# Patient Record
Sex: FEMALE | Race: BLACK OR AFRICAN AMERICAN | URBAN - METROPOLITAN AREA
[De-identification: names, ages, dates, MRNs, and addresses within clinical notes are randomized per-mention and may not be internally consistent; named-entity substitution may affect disease eponyms.]

---

## 2021-10-12 ENCOUNTER — OFFICE VISIT (OUTPATIENT)
Dept: URBAN - METROPOLITAN AREA CLINIC 92 | Facility: CLINIC | Age: 46
End: 2021-10-12
Payer: COMMERCIAL

## 2021-10-12 DIAGNOSIS — R12 HEARTBURN: ICD-10-CM

## 2021-10-12 DIAGNOSIS — R13.10 DYSPHAGIA: ICD-10-CM

## 2021-10-12 DIAGNOSIS — R10.13 DYSPEPSIA: ICD-10-CM

## 2021-10-12 DIAGNOSIS — R10.13 EPIGASTRIC ABDOMINAL PAIN: ICD-10-CM

## 2021-10-12 PROCEDURE — 99244 OFF/OP CNSLTJ NEW/EST MOD 40: CPT | Performed by: INTERNAL MEDICINE

## 2021-10-12 RX ORDER — FAMOTIDINE 40 MG/1
1 TABLET AT BEDTIME TABLET, FILM COATED ORAL ONCE A DAY
Qty: 30 TABLET | Refills: 11 | OUTPATIENT
Start: 2021-10-12

## 2021-10-12 RX ORDER — OMEPRAZOLE 40 MG/1
1 CAPSULE 30 MINUTES BEFORE MORNING MEAL CAPSULE, DELAYED RELEASE ORAL TWICE DAILY
Qty: 60 | Refills: 11 | OUTPATIENT
Start: 2021-10-12

## 2021-10-12 NOTE — HPI-TODAY'S VISIT:
Patient was referred by Dr. Liss Saenz for an evaluation of epig abd pain.  A copy of this note will be sent to the referring provider   Abd pain Location epig Quality burning Frequency daily  Duration 1y  Radiation none Severity moderate Exacerbating factors eating Relieving factors tums   Assoc with nausea dyspepsia and htbrn w/dysphagia/globus   Denies diarrhea constipation hematochezia melena jaundice unintentional wt loss   Denies odynophagia food impaction CP cough anorexia light headedness   Denies scleral icterus, increased abd girth, LE edema, confusion, disorientation, memory loss, hematemesis  NSAID usage  EtOH / Tob  Fam Hx GI cancer  Prior EGD/colon, CT scan, RUQ US

## 2021-10-27 ENCOUNTER — OFFICE VISIT (OUTPATIENT)
Dept: URBAN - METROPOLITAN AREA SURGERY CENTER 16 | Facility: SURGERY CENTER | Age: 46
End: 2021-10-27
Payer: COMMERCIAL

## 2021-10-27 DIAGNOSIS — K22.2 ACQUIRED ESOPHAGEAL RING: ICD-10-CM

## 2021-10-27 DIAGNOSIS — K29.60 ADENOPAPILLOMATOSIS GASTRICA: ICD-10-CM

## 2021-10-27 PROCEDURE — 43248 EGD GUIDE WIRE INSERTION: CPT | Performed by: INTERNAL MEDICINE

## 2021-10-27 PROCEDURE — 43239 EGD BIOPSY SINGLE/MULTIPLE: CPT | Performed by: INTERNAL MEDICINE

## 2021-11-10 ENCOUNTER — OFFICE VISIT (OUTPATIENT)
Dept: URBAN - METROPOLITAN AREA TELEHEALTH 2 | Facility: TELEHEALTH | Age: 46
End: 2021-11-10
Payer: COMMERCIAL

## 2021-11-10 DIAGNOSIS — R12 HEARTBURN: ICD-10-CM

## 2021-11-10 DIAGNOSIS — K21.9 GERD (GASTROESOPHAGEAL REFLUX DISEASE): ICD-10-CM

## 2021-11-10 DIAGNOSIS — Z12.11 SCREEN FOR COLON CANCER: ICD-10-CM

## 2021-11-10 DIAGNOSIS — R13.19 DYSPHAGIA: ICD-10-CM

## 2021-11-10 DIAGNOSIS — K29.70 GASTRITIS: ICD-10-CM

## 2021-11-10 DIAGNOSIS — R10.13 EPIGASTRIC ABDOMINAL PAIN: ICD-10-CM

## 2021-11-10 DIAGNOSIS — K22.2 ESOPHAGEAL STENOSIS: ICD-10-CM

## 2021-11-10 PROCEDURE — 99214 OFFICE O/P EST MOD 30 MIN: CPT | Performed by: INTERNAL MEDICINE

## 2021-11-10 RX ORDER — OMEPRAZOLE 40 MG/1
1 CAPSULE 30 MINUTES BEFORE MORNING MEAL CAPSULE, DELAYED RELEASE ORAL TWICE DAILY
Qty: 60 | Refills: 11 | Status: ACTIVE | COMMUNITY
Start: 2021-10-12

## 2021-11-10 RX ORDER — OMEPRAZOLE 40 MG/1
1 CAPSULE 30 MINUTES BEFORE MORNING MEAL CAPSULE, DELAYED RELEASE ORAL TWICE DAILY
Qty: 60 | Refills: 11 | OUTPATIENT

## 2021-11-10 RX ORDER — FAMOTIDINE 40 MG/1
1 TABLET AT BEDTIME TABLET, FILM COATED ORAL ONCE A DAY
Qty: 30 TABLET | Refills: 11 | OUTPATIENT

## 2021-11-10 RX ORDER — FAMOTIDINE 40 MG/1
1 TABLET AT BEDTIME TABLET, FILM COATED ORAL ONCE A DAY
Qty: 30 TABLET | Refills: 11 | Status: ACTIVE | COMMUNITY
Start: 2021-10-12

## 2021-11-10 NOTE — HPI-TODAY'S VISIT:
wt stable# over 1mo (was 200)  10/27/21 EGD grade 3 reflux esophagitis EGJ stenosis, dilated to 51F non-hpylori gastritis  taking PPI/H2B   Abd pain less frequent/severe  less nausea dyspepsia and htbrn  no further dysphagia/globus   Denies diarrhea constipation hematochezia melena jaundice unintentional wt loss   Denies odynophagia food impaction CP cough anorexia light headedness   Denies scleral icterus, increased abd girth, LE edema, confusion, disorientation, memory loss, hematemesis  prior colon 9y ago, polyp removed, no surveillance exam since

## 2022-10-25 ENCOUNTER — ERX REFILL RESPONSE (OUTPATIENT)
Dept: URBAN - METROPOLITAN AREA CLINIC 92 | Facility: CLINIC | Age: 47
End: 2022-10-25

## 2022-10-25 RX ORDER — OMEPRAZOLE 40 MG/1
1 CAPSULE 30 MINUTES BEFORE MORNING MEAL CAPSULE, DELAYED RELEASE ORAL TWICE DAILY
Qty: 60 | Refills: 11 | OUTPATIENT

## 2022-10-25 RX ORDER — OMEPRAZOLE 40 MG/1
TAKE ONE CAPSULE BY MOUTH TWICE A DAY (30 MINUTES BEFORE MORNING MEAL) CAPSULE, DELAYED RELEASE ORAL
Qty: 60 CAPSULE | Refills: 0 | OUTPATIENT

## 2022-10-25 RX ORDER — FAMOTIDINE 40 MG/1
1 TABLET AT BEDTIME TABLET, FILM COATED ORAL ONCE A DAY
Qty: 30 TABLET | Refills: 11 | OUTPATIENT

## 2022-10-25 RX ORDER — FAMOTIDINE 40 MG/1
TAKE ONE TABLET BY MOUTH AT BEDTIME TABLET, FILM COATED ORAL
Qty: 30 TABLET | Refills: 0 | OUTPATIENT

## 2022-10-29 ENCOUNTER — TELEPHONE ENCOUNTER (OUTPATIENT)
Dept: URBAN - METROPOLITAN AREA CLINIC 92 | Facility: CLINIC | Age: 47
End: 2022-10-29

## 2022-12-18 ENCOUNTER — TELEPHONE ENCOUNTER (OUTPATIENT)
Dept: URBAN - METROPOLITAN AREA CLINIC 92 | Facility: CLINIC | Age: 47
End: 2022-12-18

## 2022-12-18 ENCOUNTER — DASHBOARD ENCOUNTERS (OUTPATIENT)
Age: 47
End: 2022-12-18

## 2022-12-18 PROBLEM — 63305008 STRICTURE OF ESOPHAGUS: Status: ACTIVE | Noted: 2021-11-10

## 2022-12-18 PROBLEM — 428283002 HISTORY OF POLYP OF COLON: Status: ACTIVE | Noted: 2022-12-18

## 2022-12-18 PROBLEM — 40739000 DYSPHAGIA: Status: ACTIVE | Noted: 2021-10-12

## 2022-12-18 PROBLEM — 4556007 GASTRITIS: Status: ACTIVE | Noted: 2021-11-10

## 2022-12-18 PROBLEM — 235595009 GASTROESOPHAGEAL REFLUX DISEASE: Status: ACTIVE | Noted: 2021-11-10

## 2022-12-23 ENCOUNTER — CLAIMS CREATED FROM THE CLAIM WINDOW (OUTPATIENT)
Dept: URBAN - METROPOLITAN AREA TELEHEALTH 2 | Facility: TELEHEALTH | Age: 47
End: 2022-12-23
Payer: COMMERCIAL

## 2022-12-23 ENCOUNTER — TELEPHONE ENCOUNTER (OUTPATIENT)
Dept: URBAN - METROPOLITAN AREA CLINIC 92 | Facility: CLINIC | Age: 47
End: 2022-12-23

## 2022-12-23 VITALS — WEIGHT: 200 LBS | BODY MASS INDEX: 34.15 KG/M2 | HEIGHT: 64 IN

## 2022-12-23 DIAGNOSIS — K21.9 GERD (GASTROESOPHAGEAL REFLUX DISEASE): ICD-10-CM

## 2022-12-23 DIAGNOSIS — R13.19 CERVICAL DYSPHAGIA: ICD-10-CM

## 2022-12-23 DIAGNOSIS — R10.13 EPIGASTRIC ABDOMINAL PAIN: ICD-10-CM

## 2022-12-23 DIAGNOSIS — Z86.010 PERSONAL HISTORY OF COLON POLYPS: ICD-10-CM

## 2022-12-23 DIAGNOSIS — R13.10 DYSPHAGIA: ICD-10-CM

## 2022-12-23 DIAGNOSIS — K29.70 GASTRITIS: ICD-10-CM

## 2022-12-23 DIAGNOSIS — K22.2 ESOPHAGEAL STENOSIS: ICD-10-CM

## 2022-12-23 DIAGNOSIS — R12 HEARTBURN: ICD-10-CM

## 2022-12-23 DIAGNOSIS — R10.13 DYSPEPSIA: ICD-10-CM

## 2022-12-23 PROCEDURE — 99214 OFFICE O/P EST MOD 30 MIN: CPT | Performed by: INTERNAL MEDICINE

## 2022-12-23 RX ORDER — OMEPRAZOLE 40 MG/1
1 CAPSULE 30 MINUTES BEFORE MORNING MEAL CAPSULE, DELAYED RELEASE ORAL TWICE DAILY
Qty: 60 | Refills: 11 | OUTPATIENT

## 2022-12-23 RX ORDER — OMEPRAZOLE 40 MG/1
TAKE ONE CAPSULE BY MOUTH TWICE A DAY (30 MINUTES BEFORE MORNING MEAL) CAPSULE, DELAYED RELEASE ORAL
Qty: 60 CAPSULE | Refills: 0 | COMMUNITY

## 2022-12-23 RX ORDER — FAMOTIDINE 40 MG/1
1 TABLET AT BEDTIME TABLET, FILM COATED ORAL ONCE A DAY
Qty: 30 TABLET | Refills: 11 | OUTPATIENT

## 2022-12-23 RX ORDER — FAMOTIDINE 40 MG/1
TAKE ONE TABLET BY MOUTH AT BEDTIME TABLET, FILM COATED ORAL
Qty: 30 TABLET | Refills: 0 | COMMUNITY

## 2022-12-23 NOTE — HPI-TODAY'S VISIT:
wt stable# over 13 mo (was 200)  10/27/21 EGD grade 3 reflux esophagitis EGJ stenosis, dilated to 51F non-hpylori gastritis  taking PPI/H2B "I feel way better"   Abd pain less frequent/severe less nausea dyspepsia and htbrn no further dysphagia/globus   Denies diarrhea constipation hematochezia melena jaundice unintentional wt loss   Denies odynophagia food impaction CP cough anorexia light headedness   Denies scleral icterus, increased abd girth, LE edema, confusion, disorientation, memory loss, hematemesis  prior colon 10y ago, polyp removed, no surveillance exam since